# Patient Record
Sex: FEMALE | Race: WHITE | NOT HISPANIC OR LATINO | Employment: FULL TIME | URBAN - METROPOLITAN AREA
[De-identification: names, ages, dates, MRNs, and addresses within clinical notes are randomized per-mention and may not be internally consistent; named-entity substitution may affect disease eponyms.]

---

## 2019-02-28 ENCOUNTER — OFFICE VISIT (OUTPATIENT)
Dept: URGENT CARE | Facility: CLINIC | Age: 54
End: 2019-02-28
Payer: COMMERCIAL

## 2019-02-28 VITALS
HEART RATE: 63 BPM | WEIGHT: 265 LBS | RESPIRATION RATE: 16 BRPM | SYSTOLIC BLOOD PRESSURE: 133 MMHG | HEIGHT: 62 IN | DIASTOLIC BLOOD PRESSURE: 64 MMHG | TEMPERATURE: 98.1 F | BODY MASS INDEX: 48.76 KG/M2

## 2019-02-28 DIAGNOSIS — H10.33 ACUTE BACTERIAL CONJUNCTIVITIS OF BOTH EYES: Primary | ICD-10-CM

## 2019-02-28 PROCEDURE — 99213 OFFICE O/P EST LOW 20 MIN: CPT | Performed by: NURSE PRACTITIONER

## 2019-02-28 RX ORDER — BUPROPION HYDROCHLORIDE 300 MG/1
300 TABLET ORAL DAILY
Refills: 1 | COMMUNITY
Start: 2019-02-23

## 2019-02-28 RX ORDER — ESCITALOPRAM OXALATE 10 MG/1
10 TABLET ORAL DAILY
Refills: 1 | COMMUNITY
Start: 2019-02-23

## 2019-02-28 RX ORDER — ZALEPLON 10 MG/1
10 CAPSULE ORAL
COMMUNITY

## 2019-02-28 RX ORDER — CIPROFLOXACIN HYDROCHLORIDE 3.5 MG/ML
1 SOLUTION/ DROPS TOPICAL
Qty: 5 ML | Refills: 0 | Status: SHIPPED | OUTPATIENT
Start: 2019-02-28 | End: 2019-03-07

## 2019-02-28 RX ORDER — ATENOLOL 25 MG/1
25 TABLET ORAL DAILY
COMMUNITY

## 2019-02-28 RX ORDER — GABAPENTIN 100 MG/1
100 CAPSULE ORAL DAILY
Refills: 0 | COMMUNITY
Start: 2018-11-27

## 2019-02-28 RX ORDER — ATORVASTATIN CALCIUM 10 MG/1
10 TABLET, FILM COATED ORAL DAILY
COMMUNITY

## 2019-02-28 NOTE — LETTER
February 28, 2019     Patient: Prince Patrice Anand   YOB: 1965   Date of Visit: 2/28/2019       To Whom It May Concern: It is my medical opinion that [address] is excused 2-28-19 and 3-1-19  If you have any questions or concerns, please don't hesitate to call  Sincerely,        ROSSI Dai    CC: Alessia Anand

## 2019-02-28 NOTE — PROGRESS NOTES
Assessment/Plan    Acute bacterial conjunctivitis of both eyes [H10 33]  1  Acute bacterial conjunctivitis of both eyes  ciprofloxacin (CILOXAN) 0 3 % ophthalmic solution         Subjective:     Patient ID: Dudley Anand is a 47 y o  female  Reason For Visit / Chief Complaint  Chief Complaint   Patient presents with    Eye Pain     Bilateral eye pain and redness for approx 2 days         This is a 50-year-old female patient who presents to the urgent care today  Patient is complaining of eye redness to the right eye for 24 hours  Patient states it feels irritated, it is tearing a lot and when she woke up this morning was crusted over  Patient also states that her left eye has also been read  Her left eye feels less irritated in the right eye  The patient has been putting in Visine drops  It has not improved with Visine drops  Patient denies recent changes to her eye health  She denies recent use of new soaps or detergents  She denies allergies  She denies dust exposure or foreign body to her eye  Patient denies fever, chest pain or shortness of breath  The patient's medications have been reviewed  She has no known allergies  No past medical history on file  No past surgical history on file  Family History   Problem Relation Age of Onset    No Known Problems Mother     Cancer Father        Review of Systems   Constitutional: Negative for chills and fever  Eyes: Positive for pain, discharge, redness and itching  Negative for visual disturbance  Respiratory: Negative for shortness of breath  Cardiovascular: Negative for chest pain and palpitations  Musculoskeletal: Negative for neck pain  Skin: Negative for color change  Neurological: Negative for headaches         Objective:    /64   Pulse 63   Temp 98 1 °F (36 7 °C)   Resp 16   Ht 5' 2" (1 575 m)   Wt 120 kg (265 lb)   BMI 48 47 kg/m²     Physical Exam   Constitutional: She is oriented to person, place, and time  Vital signs are normal  She appears well-developed and well-nourished  HENT:   Head: Normocephalic and atraumatic  Eyes: Lids are normal  Lids are everted and swept, no foreign bodies found  Right conjunctiva is injected  Left conjunctiva is injected  Cardiovascular: Normal rate, regular rhythm and normal heart sounds  Exam reveals no gallop and no friction rub  No murmur heard  Pulmonary/Chest: Effort normal and breath sounds normal  No stridor  No respiratory distress  She has no wheezes  She has no rales  She exhibits no tenderness  Neurological: She is alert and oriented to person, place, and time  Skin: Skin is warm and dry  Capillary refill takes less than 2 seconds  Psychiatric: She has a normal mood and affect

## 2019-02-28 NOTE — PATIENT INSTRUCTIONS
We saw you today for conjunctivitis  Take eye drops as prescribed  Follow-up with your family doctor for any concerns  Do not wear contacts while being treated

## 2020-03-14 ENCOUNTER — OFFICE VISIT (OUTPATIENT)
Dept: URGENT CARE | Facility: CLINIC | Age: 55
End: 2020-03-14
Payer: COMMERCIAL

## 2020-03-14 VITALS
OXYGEN SATURATION: 98 % | HEIGHT: 62 IN | SYSTOLIC BLOOD PRESSURE: 126 MMHG | DIASTOLIC BLOOD PRESSURE: 82 MMHG | HEART RATE: 78 BPM | BODY MASS INDEX: 51.45 KG/M2 | RESPIRATION RATE: 20 BRPM | TEMPERATURE: 97.7 F | WEIGHT: 279.6 LBS

## 2020-03-14 DIAGNOSIS — R05.9 COUGH: Primary | ICD-10-CM

## 2020-03-14 PROCEDURE — 99213 OFFICE O/P EST LOW 20 MIN: CPT | Performed by: FAMILY MEDICINE

## 2020-03-14 RX ORDER — PROMETHAZINE HYDROCHLORIDE AND CODEINE PHOSPHATE 6.25; 1 MG/5ML; MG/5ML
5 SYRUP ORAL EVERY 4 HOURS PRN
Qty: 120 ML | Refills: 0 | Status: SHIPPED | OUTPATIENT
Start: 2020-03-14

## 2020-03-14 NOTE — PATIENT INSTRUCTIONS
F/u here as needed  Go to ER if worse symptoms  Phenergan with cod as needed  F/u with PCP if no improvement

## 2020-03-14 NOTE — LETTER
March 14, 2020     Patient: Víctor Guerra   YOB: 1965   Date of Visit: 3/14/2020       To Whom it May Concern:    Diane Alcazar was seen in my clinic on 3/14/2020  She is excused from work due to illness 3/16-17/2020  If you have any questions or concerns, please don't hesitate to call  Sincerely,          Kin Edwards MD        CC: Michelle Peña

## 2020-03-14 NOTE — PROGRESS NOTES
NAME: Heidi Gruber is a 49 y o  female  : 1965    MRN: [id number]      Assessment and Plan   Cough [R05]  1  Cough  promethazine-codeine (PHENERGAN WITH CODEINE) 6 25-10 mg/5 mL syrup           Patient Instructions   Patient Instructions   F/u here as needed  Go to ER if worse symptoms  Phenergan with cod as needed  F/u with PCP if no improvement  Proceed to ER if symptoms worsen  Chief Complaint     Chief Complaint   Patient presents with    Cough     Paptient here with a cough for the past 5 days  She has taken Tamiflu for a positive  flu test on Monday of this week  She has been taking Mucinex and DayQuil for the cough  History of Present Illness   Here c/o cough and nausea  Dx with influenza on Monday  Finished tamiflu  Tessalon perles  No fevers  Exhausted  No exposure to COVID or recent travel  Review of Systems   Review of Systems   Constitutional: Positive for fatigue  Negative for fever  Respiratory: Positive for cough  Negative for chest tightness and shortness of breath  Cardiovascular: Negative for chest pain  Gastrointestinal: Positive for nausea  Negative for abdominal pain, diarrhea and vomiting  Genitourinary: Negative for difficulty urinating and dysuria  Skin: Negative for rash and wound  Neurological: Positive for weakness and headaches           Current Medications       Current Outpatient Medications:     TRAZODONE HCL PO, Take by mouth, Disp: , Rfl:     atenolol (TENORMIN) 25 mg tablet, Take 25 mg by mouth daily, Disp: , Rfl:     atorvastatin (LIPITOR) 10 mg tablet, Take 10 mg by mouth daily, Disp: , Rfl:     buPROPion (WELLBUTRIN XL) 300 mg 24 hr tablet, Take 300 mg by mouth daily, Disp: , Rfl: 1    escitalopram (LEXAPRO) 10 mg tablet, Take 10 mg by mouth daily, Disp: , Rfl: 1    gabapentin (NEURONTIN) 100 mg capsule, Take 100 mg by mouth daily, Disp: , Rfl: 0    promethazine-codeine (PHENERGAN WITH CODEINE) 6 25-10 mg/5 mL syrup, Take 5 mL by mouth every 4 (four) hours as needed for cough, Disp: 120 mL, Rfl: 0    zaleplon (SONATA) 10 MG capsule, Take 10 mg by mouth daily at bedtime, Disp: , Rfl:     Current Allergies     Allergies as of 03/14/2020    (No Known Allergies)              Past Medical History:   Diagnosis Date    Anxiety     Depression        History reviewed  No pertinent surgical history  Family History   Problem Relation Age of Onset    No Known Problems Mother     Cancer Father          Medications have been verified  The following portions of the patient's history were reviewed and updated as appropriate: allergies, current medications, past family history, past medical history, past social history, past surgical history and problem list     Objective   /82 (BP Location: Right arm, Patient Position: Sitting, Cuff Size: Large)   Pulse 78   Temp 97 7 °F (36 5 °C) (Tympanic)   Resp 20   Ht 5' 2" (1 575 m)   Wt 127 kg (279 lb 9 6 oz)   SpO2 98%   BMI 51 14 kg/m²      Physical Exam     Physical Exam   Constitutional: She is oriented to person, place, and time  She appears well-developed and well-nourished  HENT:   Head: Normocephalic  Eyes: EOM are normal    Neck: Normal range of motion  Cardiovascular: Normal rate, regular rhythm and normal heart sounds  Exam reveals no gallop and no friction rub  No murmur heard  Pulmonary/Chest: Effort normal and breath sounds normal  No respiratory distress  She has no wheezes  She has no rales  Abdominal: Soft  Bowel sounds are normal  She exhibits no distension  There is no tenderness  There is no rebound and no guarding  Musculoskeletal: Normal range of motion  Neurological: She is oriented to person, place, and time  Skin: Skin is warm and dry  No rash noted  Psychiatric: She has a normal mood and affect  Thought content normal    Nursing note and vitals reviewed